# Patient Record
Sex: MALE | Race: WHITE
[De-identification: names, ages, dates, MRNs, and addresses within clinical notes are randomized per-mention and may not be internally consistent; named-entity substitution may affect disease eponyms.]

---

## 2018-01-06 ENCOUNTER — HOSPITAL ENCOUNTER (OUTPATIENT)
Dept: HOSPITAL 68 - ERH | Age: 28
Setting detail: OBSERVATION
LOS: 1 days | End: 2018-01-07
Attending: SURGERY | Admitting: SURGERY
Payer: COMMERCIAL

## 2018-01-06 VITALS — SYSTOLIC BLOOD PRESSURE: 145 MMHG | DIASTOLIC BLOOD PRESSURE: 89 MMHG

## 2018-01-06 VITALS — HEIGHT: 69 IN | WEIGHT: 315 LBS | BODY MASS INDEX: 46.65 KG/M2

## 2018-01-06 DIAGNOSIS — K43.6: ICD-10-CM

## 2018-01-06 DIAGNOSIS — K21.9: ICD-10-CM

## 2018-01-06 DIAGNOSIS — K80.10: Primary | ICD-10-CM

## 2018-01-06 DIAGNOSIS — I10: ICD-10-CM

## 2018-01-06 LAB
ABSOLUTE GRANULOCYTE CT: 16.5 /CUMM (ref 1.4–6.5)
APTT BLD: 30 SEC (ref 25–37)
BASOPHILS # BLD: 0 /CUMM (ref 0–0.2)
BASOPHILS NFR BLD: 0.2 % (ref 0–2)
EOSINOPHIL # BLD: 0 /CUMM (ref 0–0.7)
EOSINOPHIL NFR BLD: 0 % (ref 0–5)
ERYTHROCYTE [DISTWIDTH] IN BLOOD BY AUTOMATED COUNT: 13.7 % (ref 11.5–14.5)
GRANULOCYTES NFR BLD: 89.7 % (ref 42.2–75.2)
HCT VFR BLD CALC: 43.8 % (ref 42–52)
LYMPHOCYTES # BLD: 1.4 /CUMM (ref 1.2–3.4)
MCH RBC QN AUTO: 28.1 PG (ref 27–31)
MCHC RBC AUTO-ENTMCNC: 33.3 G/DL (ref 33–37)
MCV RBC AUTO: 84.7 FL (ref 80–94)
MONOCYTES # BLD: 0.5 /CUMM (ref 0.1–0.6)
PLATELET # BLD: 316 /CUMM (ref 130–400)
PMV BLD AUTO: 9.5 FL (ref 7.4–10.4)
PROTHROMBIN TIME: 11.7 SEC (ref 9.4–12.5)
RED BLOOD CELL CT: 5.17 /CUMM (ref 4.7–6.1)
WBC # BLD AUTO: 18.4 /CUMM (ref 4.8–10.8)

## 2018-01-06 PROCEDURE — C9399 UNCLASSIFIED DRUGS OR BIOLOG: HCPCS

## 2018-01-06 PROCEDURE — G0378 HOSPITAL OBSERVATION PER HR: HCPCS

## 2018-01-06 NOTE — CT SCAN REPORT
EXAMINATION:
CT ABDOMEN AND PELVIS WITH CONTRAST
 
CLINICAL INFORMATION:
Right upper quadrant pain. Leukocytosis. Gallbladder shows cholecystitis
without stones.
 
COMPARISON:
Right upper quadrant ultrasound dated 01/06/2018.
 
TECHNIQUE:
Multidetector CT volumetric acquisition of the abdomen and pelvis was
performed after the administration of 95 and mL of intravenous Optiray 320.
The data set was reformatted in the sagittal and coronal planes and reviewed
on an independent workstation.
 
DLP:
1641.32 mGy-cm.
 
FINDINGS:
 
LOWER CHEST: Included lung bases unremarkable.
 
LIVER, GALLBLADDER, BILIARY TREE: Liver normal size and attenuation. No focal
cystic or solid mass or intra-or extrahepatic ductal dilatation. Hepatic and
portal veins patent. Gallbladder partially distended. As seen on the
ultrasound, there is hyperenhancement of the gallbladder wall mucosa with
edema of the gallbladder wall. No significant surrounding pericholecystic fat
stranding is seen. No calcified gallstones are noted.
 
PANCREAS: The pancreatic tail is atrophic. Mild fatty infiltration of the
pancreas is seen. No focal pancreatic mass or peripancreatic stranding or
edema is seen.
 
SPLEEN: Normal size and appearance. Splenic vein patent.
 
ADRENAL GLANDS AND KIDNEYS: Adrenal glands normal. Kidneys bilaterally
symmetric in size and function. No focal mass, hydronephrosis,
nephrolithiasis or perinephric stranding.
 
URETERS AND BLADDER: Ureters decompressed and within normal limits. Bladder
partially distended and within normal limits.
 
PELVIC ORGANS: Unremarkable.
 
GASTROINTESTINAL TRACT: A few scattered sigmoid colonic diverticula are seen
with no evidence of acute diverticulitis. The small and large bowel loops are
decompressed and unremarkable. Appendix and terminal ileum are normal.
 
ABDOMINAL WALL: Approximately 3.4 cm superior to the umbilicus, a tiny 0.6 cm
midline ventral wall defect is seen with herniation of fat only into a 4.4 x
2.6 cm hernia sac.
 
LYMPHOVASCULAR STRUCTURES: Abdominal aorta normal in caliber. No periaortic
collections. No abdominal or pelvic adenopathy or free fluid collection.
 
BONES: Within normal limits.
 
IMPRESSION:
 
1. Abnormal appearance of the gallbladder with gallbladder wall edema and
mucosal hyperenhancement. Findings are suspicious for acute cholecystitis. No
definite calcified gallstones are seen and no surrounding pericholecystic
inflammatory changes seen. No biliary obstruction is noted.
2. Small midline supraumbilical ventral wall hernia, containing fat only.
3. Mildly atrophic pancreas.
4. Mild sigmoid colonic diverticulosis.

## 2018-01-06 NOTE — ULTRASOUND REPORT
EXAMINATION:
US ABDOMEN LIMITED
 
CLINICAL INFORMATION:
Right upper quadrant pain. Evaluate gallbladder..
 
COMPARISON:
CT scan of the abdomen and pelvis dated 10/10/2014.
 
TECHNIQUE:
Real-time imaging of the right upper quadrant abdominal viscera.
 
FINDINGS:
 
PANCREAS: The pancreatic body and portions of the tail are visualized and
appear mildly hyperechoic, a nonspecific finding. Remainder of pancreas is
obscured by overlying bowel gas.
 
LIVER: There is diffuse increase in hepatic echogenicity, consistent with
hepatic steatosis. The liver demonstrates normal size and contour. No focal
lesion or intrahepatic biliary duct dilatation.
 
GALLBLADDER: There is abnormal gallbladder wall thickening and gallbladder
wall edema seen. The gallbladder is physiologically distended without
evidence of stones, sludge, or polyps. No sonographic Keita's sign is
elicited while scanning over the gallbladder.
 
COMMON BILE DUCT: Normal in caliber measuring 0.3 cm in diameter.
 
RIGHT KIDNEY: Normal. No hydronephrosis. No renal calculi or focal
parenchymal lesions. The kidney measures 11.9 cm in maximum dimension.
 
FREE FLUID: None.
 
IMPRESSION:
1.  Abnormal appearance of the gallbladder with findings suspicious for acute
cholecystitis. Close clinical correlation requested.
2.  Incomplete view of the pancreas. Visualized portions of pancreas appear
hyperechoic, likely due to fatty infiltration.
3.  Diffuse hepatic steatosis.
4.  Otherwise unremarkable right upper quadrant ultrasound.

## 2018-01-06 NOTE — HISTORY & PHYSICAL PRE-OP
General Information and HPI
History of Present Illness:
CC: abdominal pain
HPI: 27-year-old otherwise healthy nondiabetic nonsmoker on medications for 
hypertension for the first time ever had some upper abdominal pain after 2 hot 
dogs last night pain is really not that bad but he vomited twice today came to 
the ER pain didn't radiate its a little better now actually I was considering 
sending him home with outpatient workup but the CT scan and the ultrasound 
showed acute cholecystitis.  Patient denies any diarrhea no fevers no particular
darkening of urine (ie iced tea) or lightening / loose stools (grey), no FHx of 
gallbladder problems. Otherwise no changes bowel habits, weight or appetite. I'
ve reviewed the [facility]. No history of GERD, PUD, bleeding problems, heart disease 
or issues with anesthesia.  Past surgical history none family history no 
diabetes cancer heart disease
 
Allergies/Medications
Allergies:
Coded Allergies:
amoxicillin (UNKNOWN 01/06/18)
 
Home Med list
No Known Home Medications
 
 
Past History
 
Medical History
Cardiovascular: hypertension
 
Surgical History
Pertinent Surgical History: non-contributory
 
Past Family/Social History
 
Psychosocial History
ETOH Use: occasional use
Illicit Drug Use: denies illicit drug use
 
Review of Systems
Review of Systems:
Constitutional: No fever, sweats or weight loss
ENMT: No sore throat 
Cardiovascular: No chest pain, palpitations or leg swelling 
Respiratory: No shortness of breath, cough, or sputum or dyspnea on exertion
GI: No GERD or bleeding per rectum
: No dysuria or hematuria
Musculoskeletal: No new muscle weakness, bone or joint pain
Skin / Breast: No jaundice, rashes or itching
Psychiatric: No history of drug or alcohol abuse no depression or anxiety
Hematologic / lymphatic system: No problems with excessive bleeding, bruising, 
or blood clots
 
Exam & Diagnostic Data
Last 24 Hrs of Vital Signs/I&O
I reviewed Vital Signs
 
 
Date Time Temp Pulse Resp B/P B/P Pulse O2 O2 Flow FiO2
 
     Mean Ox Delivery Rate 
 
01/06 1931 98.7 74 18 149/85  99 Room Air  
 
01/06 1814 98.0 70 18 151/74  98 Room Air Room Air 
 
01/06 1403 97.9 75 20 147/87  98 Room Air  
 
01/06 1220 98.1 68 20 137/80  100 Room Air  
 
01/06 1021 98.1 78 20 152/75  98 Room Air  
 
01/06 0845       Room Air  
 
01/06 0820 97.4 53 20 149/83  97 Room Air  
 
 
I reviewed Intake & Output
 
 
 01/06 1600 01/06 0800 01/06 0000
 
Intake Total   
 
Output Total   
 
Balance   
 
    
 
Patient 320 lb  
 
Weight   
 
Weight Reported by Patient  
 
Measurement   
 
Method   
 
 
 
Physical Exam:
Constitutional: pleasant, no acute distress, conversant
Eyes: sclera anicteric 
ENMT: ears and nose atraumatic, moist mucous membranes, good dentition, no lip 
lesions
Neck: Supple, trachea is midline, no cervical or supraclavicular adenopathy and 
no palpable thyromegaly 
Cardiovascular: S1, S2, no murmurs, no peripheral edema 
Respiratory: clear to auscultation with normal respiratory effort and no 
intercostal retractions 
GI: abdomen soft, nontender, nondistended, no palpable hepatosplenomegaly
Extremities / lymphatics: symmetrically warm, free range of motion no peripheral
edema, no cervical, supraclavicular, axillary, or inguinal adenopathy
Musculoskeletal: Did not evaluate gait and station, no digital cyanosis, good 
muscle strength and tone no atrophy, motor grossly 5 out of 5 throughout
Skin: no jaundice, no rashes warm, nondiaphoretic, no areas of erythema or 
induration
Psychiatric: mood and affect are appropriate and alert and oriented to person 
place and time
Last 24 Hrs of Labs/Thomas:
I reviewed Laboratory Tests
 
01/06/18 1032:
Troponin I < 0.01
 
01/06/18 1032:
Anion Gap 11, Estimated GFR > 60, BUN/Creatinine Ratio 11.4, Glucose 108  H, 
Calcium 9.6, Total Bilirubin 0.3, Direct Bilirubin 0.3, AST 17, ALT 42, Alkaline
Phosphatase 92, Total Protein 6.5, Albumin 3.9, Globulin 2.6, Albumin/Globulin 
Ratio 1.5, Amylase < 30  L, Lipase 48, PT 11.7, INR 1.12, APTT 30, CBC w Diff 
MAN DIFF ORDERED, RBC 5.17, MCV 84.7, MCH 28.1, RDW 13.7, MPV 9.5, Gran % 89.7  
H, Lymphocytes % 7.4  L, Monocytes % 2.7, Eosinophils % 0, Basophils % 0.2, 
Absolute Granulocytes 16.5  H, Absolute Lymphocytes 1.4, Absolute Monocytes 0.5,
Absolute Eosinophils 0, Absolute Basophils 0, Platelet Estimate VERIFIED BY 
SMEAR, Normocytic RBCs VERIFIED, Normochromic RBCs VERIFIED, PUBS MCHC 33.3
 
 
Assessment/Plan
Assessment/Plan:
I reviewed on PACS myself today's CT scan compared to the one from October 10 3 
years ago and also today's ultrasound with needle was wall thickening no obvious
stones as consistent with acute cholecystitis, reviewed labs LFTs normal white 
blood cell count elevated granulocytes elevated
 
My impression is symptomatic gallstones.  The story is typical.  The current 
standard of care is removal of the gallbladder laparoscopically, preferably not 
emergently.  Once they become symptomatic, gallstones can lead to complications 
such as cholecystitis, pancreatitis and cholangitis and rarely others, her story
does not have hints of this and she does not have acute cholecystitis.  More 
workup is not needed but we will get a preoperative labs including LFTs.  I 
explained the nature and possibility of retained stones, and rarely, persistent 
postoperative diarrhea.  I drew a diagram illustrating how the stones cause 
problems and how the anatomy and inflammation can make the surgery more 
difficult, sometimes requiring an open procedure, and rarely to repair a bile 
duct injury leading to significant morbidity and even mortality.  This in our 
practice is exceedingly rare, but other more common risks were also discussed 
such as infection, injury to other surrounding structures such as bowel and 
blood vessels.  We also discussed the potential risks, benefits and alternatives
to the procedure and surgery in general, issues that included but were not 
limited to, anesthetic risks hemorrhage requiring transfusion, the risk of 
transfusion itself, infection, heart attack, stroke, death.  Plan is for surgery
tomorrow morning keeping observation status hopefully can be discharged postop
 
As Ranked By This Provider
Problem List:
 1. Cholecystitis

## 2018-01-06 NOTE — ED GI/GU/ABDOMINAL COMPLAINT
History of Present Illness
 
General
Chief Complaint: Abdominal Pain/Flank Pain
Stated Complaint: ABD PAIN VOMITING X 6HRS
Source: patient
Exam Limitations: no limitations
 
Vital Signs & Intake/Output
Vital Signs & Intake/Output
 Vital Signs
 
 
Date Time Temp Pulse Resp B/P B/P Pulse O2 O2 Flow FiO2
 
     Mean Ox Delivery Rate 
 
 1403 97.9 75 20 147/87  98 Room Air  
 
 1220 98.1 68 20 137/80  100 Room Air  
 
 1021 98.1 78 20 152/75  98 Room Air  
 
 0845       Room Air  
 
 0820 97.4 53 20 149/83  97 Room Air  
 
 
 
Allergies
Coded Allergies:
amoxicillin (UNKNOWN 18)
 
Reconcile Medications
No Known Home Medications
 
Triage Note:
C/O UPPER ABDOMINAL PAIN X 6 HRS. PT STATES HE IS
VOMITING YELLOW BILE. PT DENIES DIARRHEA
Triage Nurses Notes Reviewed? yes
Onset: Abrupt
Duration: better, gone now
Quality/Severity: aching
Severity Numbers: 5
Location: epigastric
Radiation: no radiation
HPI:
Patient is 27-year-old male with past medical history of GERD and hypertension 
who presents to emergency room with concerns stating that yesterday he had acute
onset of epigastric cramping localized abdominal pain and has had 3 episodes of 
bilious vomiting and for the past 4 hours symptoms have significantly improved 
of pain and nausea.  Patient is compliant with his Nexium.  Denies any 
significant alcohol use or NSAID use.  Last bowel movement was 2 days ago no 
blood no melena
Denies any fever chills chest pain shortness of breath or arm pain and jaw pain 
dysuria hematuria testicular pain or swelling
Patient has not tried to challenge by mouth since symptoms began
Patient does admit to poor dietary habits where he states that he ate hot dogs 
yesterday evening 
 
Past History
 
Travel History
Traveled to [place] past 21 day No
 
Medical History
Any Pertinent Medical History? see below for history
Cardiovascular: hypertension
 
Surgical History
Surgical History: non-contributory
 
Psychosocial History
What is your primary language English
Tobacco Use: Never used
ETOH Use: occasional use
Illicit Drug Use: denies illicit drug use
 
Family History
Hx Contributory? No
 
Review of Systems
 
Review of Systems
Constitutional:
Reports: no symptoms. 
EENTM:
Reports: no symptoms. 
Respiratory:
Reports: no symptoms. 
Cardiovascular:
Reports: no symptoms. 
GI:
Reports: see HPI, abdominal pain. 
Genitourinary:
Reports: no symptoms. 
Musculoskeletal:
Reports: no symptoms. 
Skin:
Reports: no symptoms. 
Neurological/Psychological:
Reports: no symptoms. 
Hematologic/Endocrine:
Reports: no symptoms. 
Immunologic/Allergic:
Reports: no symptoms. 
All Other Systems: Reviewed and Negative
 
Physical Exam
 
Physical Exam
General Appearance: no apparent distress, obese
Head: atraumatic
Eyes:
Bilateral: normal appearance, PERRL. 
Ears, Nose, Throat, Mouth: hearing grossly normal
Neck: normal inspection
Respiratory: normal breath sounds, chest non-tender, no respiratory distress
Cardiovascular: regular rate/rhythm
Gastrointestinal: normal bowel sounds, soft, non-tender
Extremities: normal range of motion
Neurologic/Psych: no motor/sensory deficits
Skin: intact, normal color
 
Core Measures
ACS in differential dx? No
Sepsis Present: No
Sepsis Focused Exam Completed? No
 
Progress
Differential Diagnosis: AAA, AMI, appendicitis, biliary colic, bowel obstruction
, cholecystitis, diverticulitis, epididymitis, esophageal varices, gastritis, 
hepatitis, hernia, hemorrhoids, ischemic bowel, inflamm bowel dis, orchitis, 
pancreatitis, prostatitis, peptic ulcer, PUD/GERD, perforated viscous, 
pyelonephritis, SBO, testicular torsion, ureterolithiasis, urinary retention, 
urethritis, UTI/pyelo
Plan of Care:
 Orders
 
 
Procedure Date/time Status
 
Nothing by Mouth  B Active
 
CBC WITHOUT DIFFERENTIAL 600 Active
 
BASIC ELECTROLYTES PLUS BUN& Active
 
Clear Liquid Diet  D Complete
 
Patient Data  1702 Active
 
Vital Signs  1702 Active
 
Intake & Output  1702 Active
 
Code Status  1702 Active
 
Add-on Test (ER Only)  1404 Active
 
PARTIAL THROMBOPLASTIN TIME  1032 Complete
 
PROTHROMBIN TIME  1032 Complete
 
TYPE & SCREEN (NOT X-MATCH)  1032 Complete
 
TROPONIN LEVEL  0935 Complete
 
LIPASE  0934 Complete
 
DIRECT BILIRUBIN  0934 Complete
 
COMPREHENSIVE METABOLIC PANEL  0934 Complete
 
CBC WITHOUT DIFFERENTIAL  0934 Complete
 
AMYLASE  0934 Complete
 
EKG  0822 Active
 
 
 Current Medications
 
 
  Sig/Lanie Start time  Last
 
Medication Dose  Stop Time Status Admin
 
Ceftriaxone Sodium 1,000 MG Q24H  1500 UNVr 
 
(Rocephin)     
 
Pantoprazole Sodium 40 MG DAILY  1000 UNVr 
 
(Protonix)     
 
Metronidazole 500 MG Q8H  0100 UNVr 
 
(Flagyl)     
 
N/A 1 UNIT    
 
(No Carrier)     
 
Heparin Sodium  5,000 UNIT Q8  2200 UNVr 
 
(Porcine)     
 
Morphine Sulfate 4 MG Q4P PRN  170 UNVr 
 
(Morphine)     
 
Morphine Sulfate 6 MG Q4P PRN  1700 UNVr 
 
(Morphine)     
 
Ondansetron HCl 4 MG Q6P PRN  1700 UNVr 
 
(Zofran)     
 
Sodium Chloride 1,000 ML Q8H  1700 UNVr 
 
(Normal Saline 0.9%)     1709
 
Metronidazole 500 MG ONCE ONE  1645 AC 
 
(Flagyl)    1744  
 
N/A 1 UNIT    
 
(No Carrier)     
 
Ondansetron HCl 4 MG ONCE ONE  0945 CAN 
 
(Zofran)    0946  
 
 
 Laboratory Tests
 
 
 
18 1032:
Troponin I < 0.01
 
18 1032:
Anion Gap 11, Estimated GFR > 60, BUN/Creatinine Ratio 11.4, Glucose 108  H, 
Calcium 9.6, Total Bilirubin 0.3, Direct Bilirubin 0.3, AST 17, ALT 42, Alkaline
Phosphatase 92, Total Protein 6.5, Albumin 3.9, Globulin 2.6, Albumin/Globulin 
Ratio 1.5, Amylase < 30  L, Lipase 48, PT 11.7, INR 1.12, APTT 30, CBC w Diff 
MAN DIFF ORDERED, RBC 5.17, MCV 84.7, MCH 28.1, RDW 13.7, MPV 9.5, Gran % 89.7  
H, Lymphocytes % 7.4  L, Monocytes % 2.7, Eosinophils % 0, Basophils % 0.2, 
Absolute Granulocytes 16.5  H, Absolute Lymphocytes 1.4, Absolute Monocytes 0.5,
Absolute Eosinophils 0, Absolute Basophils 0, Platelet Estimate VERIFIED BY 
SMEAR, Normocytic RBCs VERIFIED, Normochromic RBCs VERIFIED, PUBS MCHC 33.3
Patient upon initial examination was resting comfortably at bedside no apparent 
distress afebrile and has nontender abdomen.
 
After obtaining patient's blood work reexamination there was minimal right upper
quadrant abdominal pain on exam in which A limited ultrasound was ordered for 
evaluation of gallbladder
 
1410 Patient again resting currently at bedside however patient does have right 
upper quadrant pain gross cytosis and abnormal gallbladder wall thickening 
concerns of acute cholecystitis, patient was placed nothing by mouth IV Rocephin
was administered surgery was paged
 
Discussed patient with Yoan Khalil MD who advised patient to receive CT 
scan 
 
Persistent findings of acute cholecystitis is noted after CT scan resulted 
however again no observed gallstones is noted
 
 
Yoan Khalil MD was paged discussed patient with Yoan Khalil MD who 
advised patient to be placed in observation and will most likely perform 
cholecystectomy tomorrow he agrees with Flagyl and Rocephin administration 
prophylactic antibiotics discussed observation with patient and family members 
were aware patient again resting currently at bedside
 
Discussed patient with surgical PA who will consult
 
 
Diagnostic Imaging:
Viewed by Me: Ultrasound. 
Radiology Impression: acute abnormality
Initial ED EK BPM,NSR
Comments:
 
PATIENT: VADAKIN,MICHAEL JOSEPH  MEDICAL RECORD NO: [id number]
PRESENT AGE: 27  PATIENT ACCOUNT NO: [id number]
: 89  LOCATION: [facility]
ORDERING PHYSICIAN: Jeffrey MAY  
 
  SERVICE DATE: 
EXAM TYPE: CAT - CT ABD & PELVIS W IV CONTRAST
 
EXAMINATION:
CT ABDOMEN AND PELVIS WITH CONTRAST
 
CLINICAL INFORMATION:
Right upper quadrant pain. Leukocytosis. Gallbladder shows cholecystitis
without stones.
 
COMPARISON:
Right upper quadrant ultrasound dated 2018.
 
TECHNIQUE:
Multidetector CT volumetric acquisition of the abdomen and pelvis was
performed after the administration of 95 and mL of intravenous Optiray 320.
The data set was reformatted in the sagittal and coronal planes and reviewed
on an independent workstation.
 
DLP:
1641.32 mGy-cm.
 
FINDINGS:
 
LOWER CHEST: Included lung bases unremarkable.
 
LIVER, GALLBLADDER, BILIARY TREE: Liver normal size and attenuation. No focal
cystic or solid mass or intra-or extrahepatic ductal dilatation. Hepatic and
portal veins patent. Gallbladder partially distended. As seen on the
ultrasound, there is hyperenhancement of the gallbladder wall mucosa with
edema of the gallbladder wall. No significant surrounding pericholecystic fat
stranding is seen. No calcified gallstones are noted.
 
PANCREAS: The pancreatic tail is atrophic. Mild fatty infiltration of the
pancreas is seen. No focal pancreatic mass or peripancreatic stranding or
edema is seen.
 
SPLEEN: Normal size and appearance. Splenic vein patent.
 
ADRENAL GLANDS AND KIDNEYS: Adrenal glands normal. Kidneys bilaterally
symmetric in size and function. No focal mass, hydronephrosis,
nephrolithiasis or perinephric stranding.
 
URETERS AND BLADDER: Ureters decompressed and within normal limits. Bladder
partially distended and within normal limits.
 
PELVIC ORGANS: Unremarkable.
 
GASTROINTESTINAL TRACT: A few scattered sigmoid colonic diverticula are seen
with no evidence of acute diverticulitis. The small and large bowel loops are
decompressed and unremarkable. Appendix and terminal ileum are normal.
 
ABDOMINAL WALL: Approximately 3.4 cm superior to the umbilicus, a tiny 0.6 cm
midline ventral wall defect is seen with herniation of fat only into a 4.4 x
2.6 cm hernia sac.
 
LYMPHOVASCULAR STRUCTURES: Abdominal aorta normal in caliber. No periaortic
collections. No abdominal or pelvic adenopathy or free fluid collection.
 
BONES: Within normal limits.
 
IMPRESSION:
 
1. Abnormal appearance of the gallbladder with gallbladder wall edema and
mucosal hyperenhancement. Findings are suspicious for acute cholecystitis. No
definite calcified gallstones are seen and no surrounding pericholecystic
inflammatory changes seen. No biliary obstruction is noted.
2. Small midline supraumbilical ventral wall hernia, containing fat only.
3. Mildly atrophic pancreas.
4. Mild sigmoid colonic diverticulosis.
 
 
DICTATED BY: Farzana Tay MD 
DATE/TIME DICTATED:18 / 
 
 
PATIENT: VADAKIN,MICHAEL JOSEPH  MEDICAL RECORD NO: [id number]
PRESENT AGE: 27  PATIENT ACCOUNT NO: [id number]
: 89  LOCATION: [facility]
ORDERING PHYSICIAN: Jeffrey MAY  
 
  SERVICE DATE: 
EXAM TYPE: US - US-LIMITED ABDOMEN
 
EXAMINATION:
US ABDOMEN LIMITED
 
CLINICAL INFORMATION:
Right upper quadrant pain. Evaluate gallbladder..
 
COMPARISON:
CT scan of the abdomen and pelvis dated 10/10/2014.
 
TECHNIQUE:
Real-time imaging of the right upper quadrant abdominal viscera.
 
FINDINGS:
 
PANCREAS: The pancreatic body and portions of the tail are visualized and
appear mildly hyperechoic, a nonspecific finding. Remainder of pancreas is
obscured by overlying bowel gas.
 
LIVER: There is diffuse increase in hepatic echogenicity, consistent with
hepatic steatosis. The liver demonstrates normal size and contour. No focal
lesion or intrahepatic biliary duct dilatation.
 
GALLBLADDER: There is abnormal gallbladder wall thickening and gallbladder
wall edema seen. The gallbladder is physiologically distended without
evidence of stones, sludge, or polyps. No sonographic Keita's sign is
elicited while scanning over the gallbladder.
 
COMMON BILE DUCT: Normal in caliber measuring 0.3 cm in diameter.
 
RIGHT KIDNEY: Normal. No hydronephrosis. No renal calculi or focal
parenchymal lesions. The kidney measures 11.9 cm in maximum dimension.
 
FREE FLUID: None.
 
IMPRESSION:
1.  Abnormal appearance of the gallbladder with findings suspicious for acute
cholecystitis. Close clinical correlation requested.
2.  Incomplete view of the pancreas. Visualized portions of pancreas appear
hyperechoic, likely due to fatty infiltration.
3.  Diffuse hepatic steatosis.
4.  Otherwise unremarkable right upper quadrant ultrasound.
 
DICTATED BY: Farzana Tay MD 
DATE/TIME DICTATED:18
 
Departure
 
Departure
Disposition: STILL A PATIENT
Condition: Guarded
Clinical Impression
Primary Impression: Cholecystitis
Referrals:
Noa Fajardo (PCP/Family)
 
Departure Forms:
Customer Survey
General Discharge Information
Prescriptions:
Current Visit Scripts
No Known Home Medications     
 
 
Observation Note
Spoke With:
Simran GUADALUPE,Yoan FLOYD.
Physician Advisor Notified: NAVJOT GUADALUPE,WINTER VIEIRA
Place Patient In: Non-ED OBS Care Area
Rationale for Observation:
My rational for observation is as follows [patient requires nothing by mouth 
after midnight serial exams frequent vital sign evaluation and surgical 
intervention most likely cholecystectomy performed tomorrow].
 
 
Critical Care Note
 
Critical Care Note
Critical Care Time: 30-74 min

## 2018-01-07 VITALS — DIASTOLIC BLOOD PRESSURE: 70 MMHG | SYSTOLIC BLOOD PRESSURE: 142 MMHG

## 2018-01-07 VITALS — SYSTOLIC BLOOD PRESSURE: 114 MMHG | DIASTOLIC BLOOD PRESSURE: 69 MMHG

## 2018-01-07 LAB
ABSOLUTE GRANULOCYTE CT: 6.8 /CUMM (ref 1.4–6.5)
BASOPHILS # BLD: 0.1 /CUMM (ref 0–0.2)
BASOPHILS NFR BLD: 0.7 % (ref 0–2)
EOSINOPHIL # BLD: 0.1 /CUMM (ref 0–0.7)
EOSINOPHIL NFR BLD: 1.4 % (ref 0–5)
ERYTHROCYTE [DISTWIDTH] IN BLOOD BY AUTOMATED COUNT: 13.8 % (ref 11.5–14.5)
GRANULOCYTES NFR BLD: 64 % (ref 42.2–75.2)
HCT VFR BLD CALC: 39.9 % (ref 42–52)
LYMPHOCYTES # BLD: 2.9 /CUMM (ref 1.2–3.4)
MCH RBC QN AUTO: 28.6 PG (ref 27–31)
MCHC RBC AUTO-ENTMCNC: 33.5 G/DL (ref 33–37)
MCV RBC AUTO: 85.4 FL (ref 80–94)
MONOCYTES # BLD: 0.7 /CUMM (ref 0.1–0.6)
PLATELET # BLD: 275 /CUMM (ref 130–400)
PMV BLD AUTO: 9.6 FL (ref 7.4–10.4)
RED BLOOD CELL CT: 4.67 /CUMM (ref 4.7–6.1)
WBC # BLD AUTO: 10.6 /CUMM (ref 4.8–10.8)

## 2018-01-07 NOTE — PN- GENERAL SURGERY
Subjective
Subjective:
Post op check
 
Awake, alert
tolerating regular diet without pain or nausea
Pain is well controlled
 
Objective
Vital Signs and I&Os
Vital Signs
 
 
Date Time Temp Pulse Resp B/P B/P Pulse O2 O2 Flow FiO2
 
     Mean Ox Delivery Rate 
 
01/07 0655 97.8 57 20 114/69  98 Room Air  
 
01/06 2029 98.5 67 18 145/89  98 Room Air  
 
01/06 1931 98.7 74 18 149/85  99 Room Air  
 
01/06 1814 98.0 70 18 151/74  98 Room Air Room Air 
 
 
 Intake & Output
 
 
 01/07 1600 01/07 0800 01/07 0000 01/06 1600 01/06 0800 01/06 0000
 
Intake Total  1100 735   
 
Output Total  700    
 
Balance  400 735   
 
       
 
Intake, IV  1100 375   
 
Intake, Oral   360   
 
Output, Urine  700    
 
Patient   320 lb 320 lb  
 
Weight      
 
Weight    Reported by Patient  
 
Measurement      
 
Method      
 
 
 
Physical Exam:
vss, afebrile
 
General: alert and oriented times three
Abd: soft, dressings dry
 
Assessment/Plan
Assessment/Plan
28yo male s/p lap ernesto
 
dc home
all questions answered
percocet transmitted to [facility] pharmacy
fu 10-14 days with dr snyder
 
 
Core Measures
 
Venous Thromboembolism
VTE Risk Factors Surgery
No Mechanical VTE Prophylaxis d/t N/A MechProphylax Ordered
No VTE Pharm Prophylaxis d/t NA PharmProphylax ordered

## 2018-01-07 NOTE — PATIENT DISCHARGE INSTRUCTIONS
Discharge Instructions
 
General Discharge Information
You were seen/treated for:
acute cholecystitis
You had these procedures:
lap ernesto
Watch for these problems:
temp>101, increased redness or drainage of wounds, increased abdominal pain or 
nausea
No bath, but you may shower: Yes
Other wound care:
Keep iincision clean and dry, may shower but no bathing or soaking.
Special Instructions:
Call for any increased shortness of breath, chest pain, palpitations, leg pain
 
Diet
Recommended Diet: Regular no added salt
 
Activity
Activity Self Limited: Yes
Pounds, do NOT lift more than: 10
 
Acute Coronary Syndrome
 
Inclusion Criteria
At DC or during hospital stay patient has or had the following:
ACS DIAGNOSIS No
 
Discharge Core Measures
Meds if any: Prescribed or Continued at Discharge
Meds if any: NOT Prescribed or Continued at Discharge
 
Congestive Heart Failure
 
Inclusion Criteria
At DC or during hospital stay patient has or had the following:
CHF DIAGNOSIS No
 
Discharge Core Measures
Meds if any: Prescribed or Continued at Discharge
Meds if any: NOT Prescribed or Continued at Discharge
 
Cerebrovascular accident
 
Inclusion Criteria
At DC or during hospital stay patient has or had the following:
CVA/TIA Diagnosis No
 
Discharge Core Measures
Meds if any: Prescribed or Continued at Discharge
Meds if any: NOT Prescribed or Continued at Discharge
 
Venous thromboembolism
 
Inclusion Criteria
VTE Diagnosis No
VTE Type NONE
VTE Confirmed by (Test) NONE
 
Discharge Core Measures
- Per Current guidelines, there needs to be overlap
- treatment for the first 5 days of Warfarin therapy.
- If discharged on Warfarin prior to 5 days of
- overlap therapy, the patient will need to be
- assessed for post discharge needs including
- *Post discharge parental anticoagulation
- *Warfarin and/or parental anticoagulation education
- *Follow up date to check INR post discharge
At least 5 days overlap therapy as Inpatient No
Meds if any: Prescribed or Continued at Discharge
Note: Overlap Therapy is Warfarin and Anticoagulant
Meds if any: NOT Prescribed or Continued at Discharge

## 2018-01-07 NOTE — PN- STUDENT
MarcioGerard 01/07/18 0741:
Subjective
Subjective:
PRE OPERATIVE NOTE
Sesar is an obese 26yo Caucasain male admitted to the floors for acute 
cholecystitis. He is scheduled to have cholecystecomy today 01/07/2018. He 
states he is in no acute discomfort since pain medications have been 
administered but is anxious to leave the hospital. Patient has not eaten or had 
a bowel movement since arriving to the ER on 01/05/18 however he is passing 
flatus. Nikhil has a significant past medical history of GERD, and 
tonsillectomy. Only daily medication is Nexium (amount unknown). 
He currently has IVF and perioperative antibiotics in place. 
Patient denies: H/A, changes in vision, SOB, chest pain, abd pain, N/V/D, 
hematuria, paresthesias or syncope. 
 
Patient has no complaints at this time. 
 
Objective
Objective:
GENERAL: Patient sitting upright comfortably in bed with pleasant affect. 
Patient is in no acute distress, A+Ox4. 
RESP: Good respiratory effort. Lungs CTAB. 
CARDIO: RRR, no M/R/G appreciated at this time. S1 and S2 audible throughout. 
ABD: Soft, nontender, nondistended, diminished active bowel sounds audible in 
all 4 quadrants. No organomegaly, scars or bruits noted. 
MUSC: 5/5 strength, full ROM throughout. No pain upon palpation
NEURO: Sensation intact throughout. 
 
Results
Results:
Laboratory Tests
 
01/07/18 0720:
Sodium Pending, Potassium Pending, Chloride Pending, Carbon Dioxide Pending, 
Anion Gap Pending, BUN Pending, Creatinine Pending, BUN/Creatinine Ratio Pending
, Total Bilirubin Pending, Direct Bilirubin Pending, AST Pending, ALT Pending, 
Alkaline Phosphatase Pending, Total Protein Pending, Albumin Pending, CBC w Diff
Pending, WBC Pending, RBC Pending, Hgb Pending, Hct Pending, MCV Pending, MCH 
Pending, RDW Pending, Plt Count Pending, MPV Pending, PUBS MCHC Pending
 
01/06/18 1032:
Troponin I < 0.01
 
01/06/18 1032:
Anion Gap 11, Estimated GFR > 60, BUN/Creatinine Ratio 11.4, Glucose 108  H, 
Calcium 9.6, Total Bilirubin 0.3, Direct Bilirubin 0.3, AST 17, ALT 42, Alkaline
Phosphatase 92, Total Protein 6.5, Albumin 3.9, Globulin 2.6, Albumin/Globulin 
Ratio 1.5, Amylase < 30  L, Lipase 48, PT 11.7, INR 1.12, APTT 30, CBC w Diff 
MAN DIFF ORDERED, RBC 5.17, MCV 84.7, MCH 28.1, RDW 13.7, MPV 9.5, Gran % 89.7  
H, Lymphocytes % 7.4  L, Monocytes % 2.7, Eosinophils % 0, Basophils % 0.2, 
Absolute Granulocytes 16.5  H, Absolute Lymphocytes 1.4, Absolute Monocytes 0.5,
Absolute Eosinophils 0, Absolute Basophils 0, Platelet Estimate VERIFIED BY 
SMEAR, Normocytic RBCs VERIFIED, Normochromic RBCs VERIFIED, PUBS MCHC 33.3
 
 
Assessment/Plan
Assessment:
Sesar is an obese 26yo  male presenting pre op r/t cholecystectomy 
scheduled for 01/07/2018. Patient is in no acute distress and does not have any 
pain/questions or complaints at this time. Sesar is anxious to leave the 
hospital and is slightly agitated that he has been in the ER for an extended 
period of time. 
 
Plan:
NPO
Continue IVF/perioperative antibiotics.
Prep Sesar appropriately for scheduled cholecystecomy. 
 
 
Sarai Fong 01/07/18 8712:
Assessment/Plan
Plan:
Agree with above student note.
Pt seen this morning as above.
 
Plan for OR today.

## 2018-01-18 NOTE — OPERATIVE REPORT
Operative/Inv Procedure Report
Surgery Date: 01/07/18
Name of Procedure:
Laparoscopic cholecystectomy and primary repair of incarcerated epigastric 
hernia
Pre-Operative Diagnosis:
Acute cholecystitis
Post-Operative Diagnosis:
Same, and incarcerated epigastric hernia
Estimated Blood Loss: less than 50ml
Surgeon/Assistant:
Simran GUADALUPE,Yoan Mcrae
Anesthesia: general endotracheal tube
 
Operative/Procedure Note
Note:
Patient was positioned supine. After successful induction of general anesthesia,
the patient's abdomen was clipped, prepped and draped in the usual sterile 
fashion. Local anesthetic was injected at the top of the umbilicus and then a 
curved horizontal incision little over a centimeter was made there with a 15 
blade and then deepened.  At this point we encountered some preperitoneal fat 
and a very thick deep subcutaneous layer and eventually you could see that he 
had an incarcerated low epigastric hernia so we decided to use this as the entry
for the trocar but it was not reducible it took some time to define the fascial 
edges we had to excise some of this incarcerated fat were able to reduce it was 
a transverse defect over a centimeter and a half wide.   Both sides were secured
with 0 Vicryl stay sutures and then the thin peritoneal layer was entered, 10 mm
Self trocar inserted obliquely to the right, and the gas was turned on to 15 
mm. After insufflation and repositioning to reverse Trendelenburg, 3 more 
dissecting 5 mm trochars were placed in the right subcostal area, first lateral,
then mid-subcostal, then subxiphoid. The gallbladder fundus was grasped from the
lateral port and retracted up over the edge of the liver and then we dissected 
out the area of the triangle of Calot while retracting the infundibulum caudally
/ laterally.  First the cystic duct was identified, isolated at the neck, 
clipped 3 times, divided after the second clip and then in similar fashion the 
cystic artery was identified medially, dissected and divided. Then the 
gallbladder was  from the liver bed using cautery then lowered into an 
Endobag and removed through the umbilical incision. The instruments and then the
trochars were removed letting the gas escape. The fascial defect was reassessed 
and closed transversely with multiple interrupted 0 Vicryl sutures, 6. No mesh. 
Then all 4 skin incisions were closed with interrupted subcuticular 4-0 Monocryl
, followed by Mastisol Steri-Strips and Bandaids.  Estimated blood loss was 
minimal, lap and sponge counts were correct, wound expectancy was clean-
contaminated, IV fluids crystalloid, complications none, patient tolerated the 
procedure well and was returned to the recovery room in satisfactory condition.